# Patient Record
Sex: FEMALE | URBAN - METROPOLITAN AREA
[De-identification: names, ages, dates, MRNs, and addresses within clinical notes are randomized per-mention and may not be internally consistent; named-entity substitution may affect disease eponyms.]

---

## 2019-03-09 ENCOUNTER — TELEPHONE (OUTPATIENT)
Dept: OTHER | Facility: CLINIC | Age: 24
End: 2019-03-09

## 2019-03-09 NOTE — TELEPHONE ENCOUNTER
3/9/2019    Call Regarding Onboarding: [facility]    Attempt 2    Message left with patient     Comments: PT ON BOARDED      Outreach